# Patient Record
Sex: MALE | Race: WHITE
[De-identification: names, ages, dates, MRNs, and addresses within clinical notes are randomized per-mention and may not be internally consistent; named-entity substitution may affect disease eponyms.]

---

## 2018-08-11 ENCOUNTER — HOSPITAL ENCOUNTER (EMERGENCY)
Dept: HOSPITAL 57 - BURERS | Age: 38
Discharge: HOME | End: 2018-08-11
Payer: COMMERCIAL

## 2018-08-11 DIAGNOSIS — K57.32: Primary | ICD-10-CM

## 2018-08-11 DIAGNOSIS — F17.210: ICD-10-CM

## 2018-08-11 DIAGNOSIS — F31.9: ICD-10-CM

## 2018-08-11 LAB
ALBUMIN SERPL BCG-MCNC: 4 G/DL (ref 3.5–5)
ALP SERPL-CCNC: 65 U/L (ref 40–150)
ALT SERPL W P-5'-P-CCNC: 11 U/L (ref 8–55)
ANION GAP SERPL CALC-SCNC: 12 MMOL/L (ref 10–20)
AST SERPL-CCNC: 14 U/L (ref 5–34)
BASOPHILS # BLD AUTO: 0.1 THOU/UL (ref 0–0.2)
BASOPHILS NFR BLD AUTO: 0.9 % (ref 0–1)
BILIRUB SERPL-MCNC: 0.3 MG/DL (ref 0.2–1.2)
BUN SERPL-MCNC: 10 MG/DL (ref 8.9–20.6)
CALCIUM SERPL-MCNC: 9.5 MG/DL (ref 7.8–10.44)
CHLORIDE SERPL-SCNC: 103 MMOL/L (ref 98–107)
CO2 SERPL-SCNC: 27 MMOL/L (ref 22–29)
CREAT CL PREDICTED SERPL C-G-VRATE: 0 ML/MIN (ref 70–130)
DRUG SCREEN CUTOFF: (no result)
EOSINOPHIL # BLD AUTO: 0.1 THOU/UL (ref 0–0.7)
EOSINOPHIL NFR BLD AUTO: 0.7 % (ref 0–10)
GLOBULIN SER CALC-MCNC: 2.7 G/DL (ref 2.4–3.5)
GLUCOSE SERPL-MCNC: 99 MG/DL (ref 70–105)
HGB BLD-MCNC: 13.9 G/DL (ref 14–18)
LYMPHOCYTES # BLD AUTO: 2.7 THOU/UL (ref 1.2–3.4)
LYMPHOCYTES NFR BLD AUTO: 19.2 % (ref 21–51)
MCH RBC QN AUTO: 30.2 PG (ref 27–31)
MCV RBC AUTO: 83.1 FL (ref 78–98)
MDIFF COMPLETE?: YES
MEDTOX CONTROL LINE VALID?: (no result)
MONOCYTES # BLD AUTO: 0.8 THOU/UL (ref 0.11–0.59)
MONOCYTES NFR BLD AUTO: 5.8 % (ref 0–10)
NEUTROPHILS # BLD AUTO: 10.4 THOU/UL (ref 1.4–6.5)
NEUTROPHILS NFR BLD AUTO: 73.4 % (ref 42–75)
PLATELET # BLD AUTO: 244 THOU/UL (ref 130–400)
POTASSIUM SERPL-SCNC: 4.2 MMOL/L (ref 3.5–5.1)
RBC # BLD AUTO: 4.58 MILL/UL (ref 4.7–6.1)
SODIUM SERPL-SCNC: 138 MMOL/L (ref 136–145)
SP GR UR STRIP: 1.02 (ref 1–1.03)
WBC # BLD AUTO: 14.2 THOU/UL (ref 4.8–10.8)

## 2018-08-11 PROCEDURE — 80053 COMPREHEN METABOLIC PANEL: CPT

## 2018-08-11 PROCEDURE — 81003 URINALYSIS AUTO W/O SCOPE: CPT

## 2018-08-11 PROCEDURE — 85025 COMPLETE CBC W/AUTO DIFF WBC: CPT

## 2018-08-11 PROCEDURE — 74176 CT ABD & PELVIS W/O CONTRAST: CPT

## 2018-08-11 PROCEDURE — 80306 DRUG TEST PRSMV INSTRMNT: CPT

## 2018-08-11 NOTE — CT
CT ABDOMEN AND PELVIS WITHOUT CONTRAST

8/11/18

 

Spiral CT of the abdomen and pelvis was performed for evaluation of lower abdominal pain. hematuria w
as said to be present as well. Axial slices were acquired, then coronal and sagittal reconstructions 
were done without any oral or IV contrast. 

 

The patient has sigmoid diverticulosis and there is a very small amount of haziness around the sigmoi
d colon in this location. Mild acute diverticulitis is suspected. This loop of colon does indent the 
urinary bladder slightly from the left side. 

 

The lung bases are clear. the liver, spleen, pancreas, adrenal glands, gallbladder, kidneys, and abdo
sweetie aorta appear normal within the limitations of a noncontrast study. There was no sign of urinary
 tract calculi or obstruction.

 

The major finding in the bowel was slight haziness around the sigmoid colon. There is some nonspecifi
c fluid filled loops of small bowel which are not dilated. I could see no inflammatory changes around
 the base of the cecum. No free air, free fluid of concern, or other findings were encountered.

 

CT of the pelvis was remarkable for the sigmoid findings only. There were no other items of concern h
ere.

 

IMPRESSION:  

Findings consistent with mild sigmoid diverticulitis.

 

POS: HOME